# Patient Record
Sex: FEMALE | Race: WHITE
[De-identification: names, ages, dates, MRNs, and addresses within clinical notes are randomized per-mention and may not be internally consistent; named-entity substitution may affect disease eponyms.]

---

## 2017-10-06 ENCOUNTER — HOSPITAL ENCOUNTER (OUTPATIENT)
Dept: HOSPITAL 80 - FIMAGING | Age: 67
End: 2017-10-06
Attending: FAMILY MEDICINE
Payer: COMMERCIAL

## 2017-10-06 DIAGNOSIS — Z12.31: Primary | ICD-10-CM

## 2017-10-06 PROCEDURE — G0202 SCR MAMMO BI INCL CAD: HCPCS

## 2018-11-08 ENCOUNTER — HOSPITAL ENCOUNTER (EMERGENCY)
Dept: HOSPITAL 80 - FED | Age: 68
Discharge: HOME | End: 2018-11-08
Payer: COMMERCIAL

## 2018-11-08 VITALS — DIASTOLIC BLOOD PRESSURE: 93 MMHG | SYSTOLIC BLOOD PRESSURE: 143 MMHG

## 2018-11-08 DIAGNOSIS — I82.491: Primary | ICD-10-CM
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
## 2019-03-22 ENCOUNTER — HOSPITAL ENCOUNTER (OUTPATIENT)
Dept: HOSPITAL 80 - FIMAGING | Age: 69
End: 2019-03-22
Attending: PHYSICIAN ASSISTANT
Payer: COMMERCIAL

## 2019-03-22 DIAGNOSIS — M79.661: Primary | ICD-10-CM

## 2019-03-22 DIAGNOSIS — I80.01: ICD-10-CM

## 2019-03-22 DIAGNOSIS — Z86.718: ICD-10-CM

## 2019-04-10 ENCOUNTER — HOSPITAL ENCOUNTER (OUTPATIENT)
Dept: HOSPITAL 80 - FIMAGING | Age: 69
End: 2019-04-10
Attending: FAMILY MEDICINE
Payer: COMMERCIAL

## 2019-04-10 DIAGNOSIS — D25.9: ICD-10-CM

## 2019-04-10 DIAGNOSIS — Z85.038: ICD-10-CM

## 2019-04-10 DIAGNOSIS — R93.89: Primary | ICD-10-CM

## 2019-04-10 DIAGNOSIS — Z78.0: ICD-10-CM
